# Patient Record
(demographics unavailable — no encounter records)

---

## 2024-10-21 NOTE — ASSESSMENT
[FreeTextEntry1] : A/P + coloagard test  I discussed the risks and benefits of colonoscopy and patient was given opportunity to ask questions. Colonoscopy to r/o colon cancer, polyps, AVM's. Patient is medically optimized for the procedure

## 2025-07-10 NOTE — DISCUSSION/SUMMARY
[de-identified] : WBAT in supportive footwear. Pt. recommended a course of PT. Physical therapy was prescribed for 2-3 times per week for four weeks. Stretching exercises demonstrated and recommended. Ice to affected area. Activity modification.  The patient was prescribed meloxicam 15mg daily as needed for pain.  They were explained the potential risks of GI pain/bleeding as well as hypertension.  They were told not to take any other nsaids while taking this medication.

## 2025-07-10 NOTE — PHYSICAL EXAM
[NL (40)] : plantar flexion 40 degrees [NL 30)] : inversion 30 degrees [NL (20)] : eversion 20 degrees [5___] : dorsiflexion 5[unfilled]/5 [4___] : eversion 4[unfilled]/5 [2+] : posterior tibialis pulse: 2+ [Normal] : saphenous nerve sensation normal [] : non-antalgic [Left] : left ankle [Weight -] : weightbearing [There are no fractures, subluxations or dislocations. No significant abnormalities are seen] : There are no fractures, subluxations or dislocations. No significant abnormalities are seen [FreeTextEntry9] :  AP and mortise xrays of the ankle were taken and reviewed today.  [de-identified] :  AP, lateral and oblique xray views were taken and reviewed today. Plantar heel spur.   [TWNoteComboBox7] : dorsiflexion 10 degrees

## 2025-07-10 NOTE — HISTORY OF PRESENT ILLNESS
[Gradual] : gradual [4] : 4 [Radiating] : radiating [Shooting] : shooting [Tightness] : tightness [Constant] : constant [Standing] : standing [Walking] : walking [Stairs] : stairs [Full time] : Work status: full time [de-identified] : Pt. is a 52 year old female who presents for evaluation of her LT foot. Denies trauma. Symptoms for over a year. She has tried home exercises, soaking and electrical stim without significant relief. Has done chiro care and acupuncture. Has tried NSAIDS. WB in sandals. Symptoms are improved with exercise. There is start up pain. No previous injury/problem with LT foot.  [] : no [FreeTextEntry1] : l foot [FreeTextEntry5] : pain for a year, has seen other ortho, chiro, accupuncture, no injury to the foot/ ankle [FreeTextEntry6] : tight in the AM [FreeTextEntry7] : ankle [FreeTextEntry9] : chiro treatments, stretching/HEP [de-identified] : xr with other foot dr

## 2025-07-10 NOTE — REVIEW OF SYSTEMS
[Joint Pain] : joint pain [Negative] : Heme/Lymph [Joint Stiffness] : joint stiffness [FreeTextEntry9] : in the AM